# Patient Record
Sex: MALE | Race: WHITE
[De-identification: names, ages, dates, MRNs, and addresses within clinical notes are randomized per-mention and may not be internally consistent; named-entity substitution may affect disease eponyms.]

---

## 2021-11-19 ENCOUNTER — HOSPITAL ENCOUNTER (EMERGENCY)
Dept: HOSPITAL 47 - EC | Age: 31
Discharge: OTHER INSTITUTION | End: 2021-11-19
Payer: COMMERCIAL

## 2021-11-19 VITALS — TEMPERATURE: 97.7 F | RESPIRATION RATE: 18 BRPM

## 2021-11-19 VITALS — SYSTOLIC BLOOD PRESSURE: 192 MMHG | DIASTOLIC BLOOD PRESSURE: 99 MMHG | HEART RATE: 56 BPM

## 2021-11-19 DIAGNOSIS — Z20.822: ICD-10-CM
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
## 2021-11-19 DIAGNOSIS — S82.302A: Primary | ICD-10-CM

## 2021-11-19 DIAGNOSIS — F17.200: ICD-10-CM

## 2021-11-19 DIAGNOSIS — Z88.0: ICD-10-CM

## 2021-11-19 DIAGNOSIS — Z91.018: ICD-10-CM
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
## 2021-11-19 DIAGNOSIS — S82.832A: ICD-10-CM
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
## 2021-11-19 PROCEDURE — 96374 THER/PROPH/DIAG INJ IV PUSH: CPT

## 2021-11-19 PROCEDURE — 99285 EMERGENCY DEPT VISIT HI MDM: CPT

## 2021-11-19 PROCEDURE — 96376 TX/PRO/DX INJ SAME DRUG ADON: CPT

## 2021-11-19 PROCEDURE — 73590 X-RAY EXAM OF LOWER LEG: CPT

## 2021-11-19 PROCEDURE — 87635 SARS-COV-2 COVID-19 AMP PRB: CPT

## 2021-11-19 NOTE — ED
Lower Extremity Injury HPI





- General


Chief Complaint: Extremity Injury, Lower


Stated Complaint: leg injury


Time Seen by Provider: 11/19/21 13:23


Source: patient, EMS, RN notes reviewed


Mode of arrival: EMS


Limitations: no limitations





- History of Present Illness


Initial Comments: 





Patient is a 31-year-old male presenting to the emergency department via EMS 

after a leg injury at work.  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 

on his left leg.  He has an obvious deformity of his left lower leg.  Patient 

denies any other injuries.  Patient did receive a total of 200 g of fentanyl in

the EMS prior to arrival.  His pain is currently a 7/10.  He is able to wiggle 

all his left toes.  He is up-to-date with his vaccines.  Patient has no further 

complaints.  Upon arrival to the ER, his blood pressure is 233/127, rest of 

vitals normal.





- Related Data


                                Home Medications











 Medication  Instructions  Recorded  Confirmed


 


Desvenlafaxine Succinate [Pristiq] 100 mg PO HS 11/19/21 11/19/21


 


Testosterone Cypionate 200 mg IM Q14D 11/19/21 11/19/21





[Depo-Testosterone]   











                                    Allergies











Allergy/AdvReac Type Severity Reaction Status Date / Time


 


Penicillins Allergy  Unknown Verified 11/19/21 15:03


 


tree nut Allergy  Anaphylaxis Verified 11/19/21 15:03














Review of Systems


ROS Statement: 


Those systems with pertinent positive or pertinent negative responses have been 

documented in the HPI.





ROS Other: All systems not noted in ROS Statement are negative.





Past Medical History


Past Medical History: No Reported History


History of Any Multi-Drug Resistant Organisms: None Reported


Past Surgical History: Appendectomy, Hernia Repair, Orthopedic Surgery


Past Psychological History: PTSD


Smoking Status: Current some day smoker


Past Alcohol Use History: Occasional


Past Drug Use History: Marijuana





General Exam





- General Exam Comments


Initial Comments: 





GENERAL: 


Patient is well-developed and well-nourished.  Patient is nontoxic and in 

moderate distress.





HEAD: 


Atraumatic, normocephalic.





EYES:


Pupils equal round and reactive to light, extraocular movements intact, sclera 

anicteric, conjunctiva are normal.  Eyelids were unremarkable.





ENT: 


Oropharynx clear without exudates.  Moist mucous membranes.





NECK: 


Normal range of motion, supple without lymphadenopathy or JVD.





LUNGS:


Unlabored respirations.  Breath sounds clear to auscultation bilaterally and 

equal.  No wheezes rales or rhonchi.





HEART:


Regular rate and rhythm without murmurs, rubs or gallops.





ABDOMEN: 


Soft, nontender, normoactive bowel sounds.  No guarding, no rebound.  No masses 

appreciated.





MUSCULOSKELETAL: 


Patient has obvious deformity of the left tib-fib, he is able to wiggle his left

toes, no pain of the left knee.  He does have a good dorsal pedis and posterior 

tib pulses.  No clubbing or cyanosis.





NEUROLOGICAL: 


Patient is alert and oriented x 3.  





SKIN:


 Warm, Dry, normal turgor, no rashes or lesions noted.


Limitations: no limitations





Course


                                   Vital Signs











  11/19/21 11/19/21





  13:24 14:25


 


Temperature 97.7 F 


 


Pulse Rate 54 L 53 L


 


Respiratory 18 18





Rate  


 


Blood Pressure 233/127 197/104


 


O2 Sat by Pulse 99 100





Oximetry  














- Reevaluation(s)


Reevaluation #1: 





11/19/21 14:45


1403:  Called [facility] regarding patient


14:35: [facility] back to decline the patient.


14:46:  [facility] declined transfer.


14:51: [facility] declined





11/19/21 15:10


[facility] called back to accept the patient.  


Patient is pend EMS.





Procedures





- Orthopedic Splinting/Casting


  ** Injury #1


Lower Extremity Injury Location: short leg


Lower Extremity Immobilizer: posterior splint, stirrup splint, Ace wrap, 

synthetic pre-padded splint





Medical Decision Making





- Medical Decision Making





Patient is a 31-year-old male here with a left lower leg injury after a tree 

fell at work today.  X-rays reveal an acute transverse displaced fractures to 

the mid to distal diaphysis of the left tib and fib.  Patient did receive a 

total of 200 g affect on the EMS prior to arrival.  We have given him a total 

of 2 mg of Dilaudid.  [facility] will be transferred to [facility], Dr. Yeh and Dr. Hale are accepting.  He was placed in a posterior and 

stirrup splint.  Case discussed with Dr. May.





Disposition


Clinical Impression: 


 Closed fracture of distal end of left fibula and tibia





Disposition: OTHER INSTITUTION NOT DEFINED


Condition: Stable


Referrals: 


Safia Fernandez DO [Primary Care Provider] - 1-2 days


Time of Disposition: 15:10





- Out of Hospital Transfer - Req. Specs


Out of Hospital Transfer - Requested Specifics: Other [facility] ([facility])

## 2021-11-19 NOTE — XR
EXAMINATION TYPE: XR tibia fibula LT

 

DATE OF EXAM: 11/19/2021

 

CLINICAL HISTORY: Obvious deformity with pain after injury.

 

TECHNIQUE:  Two views of the   left  leg are obtained.

 

COMPARISON: None.

 

FINDINGS:  There is acute displaced transverse fracture through the mid to distal diaphysis of the ti
lillian and fibula. . Approximately 1.5 cm osseous overlap along with approximately 5 to 6 mm anterior di
splacement of distal fracture fragments is present. On last image there is near 2.0 cm medial displac
ement of distal fracture fragments. Visualized left knee and ankle joints appear within normal limits
. Overlying clothing material is present.

 

IMPRESSION:  There are acute transverse displaced fractures mid to distal diaphysis of the left tibia
 and fibula.